# Patient Record
Sex: MALE | NOT HISPANIC OR LATINO | URBAN - METROPOLITAN AREA
[De-identification: names, ages, dates, MRNs, and addresses within clinical notes are randomized per-mention and may not be internally consistent; named-entity substitution may affect disease eponyms.]

---

## 2021-11-30 ENCOUNTER — EMERGENCY (EMERGENCY)
Facility: HOSPITAL | Age: 36
LOS: 1 days | Discharge: HOME | End: 2021-11-30
Admitting: EMERGENCY MEDICINE
Payer: MEDICAID

## 2021-11-30 VITALS
HEART RATE: 71 BPM | TEMPERATURE: 99 F | OXYGEN SATURATION: 99 % | RESPIRATION RATE: 18 BRPM | DIASTOLIC BLOOD PRESSURE: 63 MMHG | WEIGHT: 119.93 LBS | SYSTOLIC BLOOD PRESSURE: 102 MMHG

## 2021-11-30 DIAGNOSIS — M54.9 DORSALGIA, UNSPECIFIED: ICD-10-CM

## 2021-11-30 PROCEDURE — 99283 EMERGENCY DEPT VISIT LOW MDM: CPT

## 2021-11-30 NOTE — ED ADULT TRIAGE NOTE - CHIEF COMPLAINT QUOTE
Pt walked in c/o atraumatic back pain over the last few days. States pain unrelieved by OTC ibuprofen. Denies numbness, tingling, BM or urination changes.

## 2021-12-01 RX ORDER — LIDOCAINE 4 G/100G
1 CREAM TOPICAL ONCE
Refills: 0 | Status: COMPLETED | OUTPATIENT
Start: 2021-12-01 | End: 2021-12-01

## 2021-12-01 RX ORDER — CYCLOBENZAPRINE HYDROCHLORIDE 10 MG/1
10 TABLET, FILM COATED ORAL ONCE
Refills: 0 | Status: COMPLETED | OUTPATIENT
Start: 2021-12-01 | End: 2021-12-01

## 2021-12-01 RX ORDER — IBUPROFEN 200 MG
1 TABLET ORAL
Qty: 56 | Refills: 0
Start: 2021-12-01 | End: 2021-12-14

## 2021-12-01 RX ORDER — IBUPROFEN 200 MG
600 TABLET ORAL ONCE
Refills: 0 | Status: COMPLETED | OUTPATIENT
Start: 2021-12-01 | End: 2021-12-01

## 2021-12-01 RX ORDER — TIZANIDINE 4 MG/1
2 TABLET ORAL
Qty: 84 | Refills: 0
Start: 2021-12-01 | End: 2021-12-14

## 2021-12-01 RX ORDER — MENTHOL AND CAPSAICIN .0375; 5 G/100G; G/100G
1 PATCH TOPICAL
Qty: 1 | Refills: 0
Start: 2021-12-01 | End: 2021-12-14

## 2021-12-01 RX ADMIN — Medication 600 MILLIGRAM(S): at 00:23

## 2021-12-01 RX ADMIN — LIDOCAINE 1 PATCH: 4 CREAM TOPICAL at 00:23

## 2021-12-01 RX ADMIN — CYCLOBENZAPRINE HYDROCHLORIDE 10 MILLIGRAM(S): 10 TABLET, FILM COATED ORAL at 00:23

## 2021-12-01 NOTE — ED PROVIDER NOTE - OBJECTIVE STATEMENT
35 yo m no sig pmhx pw acute onset of perispinal back pain aching mod in severity began 3 d ago with no provoking or precipitating factors, worse with torso twisting. No cough, fevers, chills, cp, sob, palpitations, abd pain, difficulty with urine, ivdu, h/o tb, h/o malignancy, saddle anesthesias, or urinary incontinences 35 yo m no sig pmhx pw acute onset of perispinal back pain aching mod in severity began 3 d ago with no provoking or precipitating factors, worse with torso twisting. No cough, fevers, chills, cp, sob, palpitations, abd pain, difficulty with urine, ivdu, h/o tb, h/o malignancy, saddle anesthesias, or urinary incontinences.    I have reviewed available current nursing and previous documentation of past medical, surgical, family, and/or social history.

## 2021-12-01 NOTE — ED PROVIDER NOTE - PHYSICAL EXAMINATION
Physical Exam    Vital Signs: I have reviewed the initial vital signs.  Constitutional: well-nourished, appears stated age, no acute distress  Cardiovascular: regular rate, regular rhythm, well-perfused extremities  Respiratory: unlabored respiratory effort  Musculoskeletal: supple neck, +b/l perispinal ttp, pain worse with torso flexion improves with rest  Neurologic: extremities’ motor and sensory functions grossly intact

## 2021-12-01 NOTE — ED PROVIDER NOTE - NSFOLLOWUPCLINICS_GEN_ALL_ED_FT
[facility]  Family Medicine  [address], [address]  [place], [place] [address]  Phone: [phone]  Fax:

## 2021-12-01 NOTE — ED PROVIDER NOTE - CLINICAL SUMMARY MEDICAL DECISION MAKING FREE TEXT BOX
35 yo m no sig pmhx pw acute onset of perispinal back pain aching mod in severity began 3 d ago with no provoking or precipitating factors, worse with torso twisting. No cough, fevers, chills, cp, sob, palpitations, abd pain, difficulty with urine, ivdu, h/o tb, h/o malignancy, saddle anesthesias, or urinary incontinences. +ttp over the perispinal muscles, neurovascular intact.

## 2021-12-01 NOTE — ED PROVIDER NOTE - PATIENT PORTAL LINK FT
You can access the FollowMyHealth Patient Portal offered by [facility] by registering at the following website: [email or web address]. By joining [employer]’s FollowMyHealth portal, you will also be able to view your health information using other applications (apps) compatible with our system.

## 2021-12-01 NOTE — ED ADULT NURSE NOTE - NSIMPLEMENTINTERV_GEN_ALL_ED
Implemented All Universal Safety Interventions:  [place] to call system. Call bell, personal items and telephone within reach. Instruct patient to call for assistance. Room bathroom lighting operational. Non-slip footwear when patient is off stretcher. Physically safe environment: no spills, clutter or unnecessary equipment. Stretcher in lowest position, wheels locked, appropriate side rails in place.

## 2021-12-01 NOTE — ED PROVIDER NOTE - NS ED ROS FT
Review of Systems    Constitutional: (-) fever  Eyes/ENT: (-) change in vision, (-) sore throat, (-) ear pain  Cardiovascular: (-) chest pain, (-) palpitation   Respiratory: (-) cough, (-) shortness of breath  Gastrointestinal: (-) abdominal pain (-) vomiting, (-) diarrhea  Musculoskeletal: (-) neck pain, (-) joint pain  Integumentary: (-) rash, (-) edema  Neurological: (-) headache, (-) altered mental status

## 2022-11-09 NOTE — ED ADULT NURSE NOTE - PRO INTERPRETER NEED 2
SAFETY CHECKLIST  ID Bands and Risk clasps correct and in place (DNR, Fall risk, Allergy, Latex, Limb):  Yes  All Lines Reconciled and labeled correctly: Yes  Whiteboard updated:Yes  Environmental interventions (bed/chair alarm on, call light, side rails, restraints, sitter....): Yes  Verify Tele #:          English

## 2024-08-21 NOTE — ED PROVIDER NOTE - NS ED ATTENDING NAME FT
"Subjective   Patient ID: Grace Urban is a 45 y.o. female who presents for Annual Exam (Pt feels that her hormones are \"off balance\" and requesting possibly a hormone panel work up or discuss options with PCP ) and Pain (RIGHT elbow and radiates down forearm intoe wrist ONGOING for 3 weeks ).    INTERVAL HX/CURRENT CONCERNS:  --Rt arm pain - starts at elbow and radiates down forearm. Bothers with things like opening jar. Present x few weeks. Does carry 3.6 yo daughter in that arm.   --Concerned about  attention issues, has had a lot of stress over past couple of years. Does take sertraline for anxiety. Wakes up at night and unable to get back to sleep at times. Worry about kids a lot. Feels overwhelmed. Does not feel the sertraline is especially helpful. Has always has issues completing tasks. Mood is more depressed lately. Sleep is ok. Falls asleep easily but wakes in the night and cannot get back to sleep. Does have some panic symptoms at times, gets overwhelmed and unable to get work done.     CHRONIC CONDITIONS:  Asthma - MARCOS PRN. Allergy and illness induced.  Anxiety - Sertraline 100 mg - filled currently by gyn.   Psoriasis - triamcinolone PRN  Nephrolithiasis - has had around 12 times, previously following with Dr. Dougherty      Works in sales for [employer]  Immunizations:     Tdap 2020    Pneumococcal - discussed (asthma)    Shingles -     COVID - discussed updated vaccine    Influenza - recommend annually    The 10-year ASCVD risk score (Brian CHUNG, et al., 2019) is: 0.5%    Values used to calculate the score:      Age: 45 years      Sex: Female      Is Non- : No      Diabetic: No      Tobacco smoker: No      Systolic Blood Pressure: 120 mmHg      Is BP treated: No      HDL Cholesterol: 81 mg/dL      Total Cholesterol: 205 mg/dL      Colonoscopy: agreeable to referral   Lung cancer screening: NEVER smoker     WOMEN'S Health  Follows with Dr. Daley for " [facility]. Appt 11/2024   Postmenopausal: no  LMP: irregular - has menses every 3 weeks, LMP first week of the month, have been shorter, every 21-23 days   HRT use: no   H/o Gyn Surgery - none  2021 NILM/HPV neg  Next PAP - per gyn  Sexually active: yes,   OB History    No obstetric history on file.       Last mammogram: prefers to have ordered by gyn  History of abnormal mammogram:   Bone Density: start age 63    Current Outpatient Medications   Medication Instructions    albuterol (ProAir HFA) 90 mcg/actuation inhaler 2 puff(s), Inhale, q4-6 hrs, PRN: as needed for wheezing, # 2 EA, 2 Refill(s), Type: Maintenance, Pharmacy: [id number], 2 puff(s) Inhale q4-6 hrs,PRN:as needed for wheezing, 61, in, 06/23/23 9:18:00 EDT, Height Measured, 120.2, lb, 06/23/23 9:18:00 EDT, Weight Measured    ondansetron ODT (ZOFRAN-ODT) 4 mg, oral, Every 8 hours PRN    sertraline (ZOLOFT) 100 mg, oral, Daily    triamcinolone (Kenalog) 0.1 % cream Topical     Allergies   Allergen Reactions    Levofloxacin Anaphylaxis    Penicillins Rash    Cephalexin Unknown    Clarithromycin Unknown    Erythromycin Nausea/vomiting, GI Upset and Rash       Immunization History   Administered Date(s) Administered    Moderna SARS-CoV-2 Vaccination 04/28/2021, 11/23/2021    Pfizer Purple Cap SARS-CoV-2 04/02/2021     Past Surgical History:   Procedure Laterality Date    DILATION AND CURETTAGE OF UTERUS  04/12/2018    Dilation And Curettage    OTHER SURGICAL HISTORY  04/12/2018    Breast Surgery Enlargement Procedure     No family history on file.  Social History     Tobacco Use    Smoking status: Never     Passive exposure: Never    Smokeless tobacco: Never   Vaping Use    Vaping status: Never Used   Substance Use Topics    Alcohol use: Yes     Comment: occasional    Drug use: Never       Review of Systems   Constitutional:  Negative for chills and fever.   HENT:  Negative for trouble swallowing.    Eyes:  Negative for visual disturbance.  "  Respiratory:  Negative for shortness of breath and wheezing.    Cardiovascular:  Negative for chest pain, palpitations and leg swelling.   Gastrointestinal:  Negative for abdominal pain, blood in stool, constipation, diarrhea and nausea.   Genitourinary:  Negative for dysuria, vaginal bleeding, vaginal discharge and vaginal pain.   Musculoskeletal:         Rt arm pain   Skin:  Negative for rash.   Neurological:  Negative for headaches.   Psychiatric/Behavioral:  Positive for decreased concentration. Negative for dysphoric mood. The patient is not nervous/anxious.        Objective   Visit Vitals  /78   Pulse 74   Temp 36.8 °C (98.2 °F)   Ht 1.53 m (5' 0.24\")   Wt 59.8 kg (131 lb 12.8 oz)   SpO2 98%   BMI 25.54 kg/m²   Smoking Status Never   BSA 1.59 m²       Physical Exam  Constitutional:       Appearance: Normal appearance.   HENT:      Head: Normocephalic and atraumatic.      Right Ear: Tympanic membrane, ear canal and external ear normal.      Left Ear: Tympanic membrane, ear canal and external ear normal.      Mouth/Throat:      Mouth: Mucous membranes are moist.      Pharynx: Oropharynx is clear.   Eyes:      Extraocular Movements: Extraocular movements intact.      Conjunctiva/sclera: Conjunctivae normal.      Pupils: Pupils are equal, round, and reactive to light.   Cardiovascular:      Rate and Rhythm: Normal rate and regular rhythm.      Pulses: Normal pulses.      Heart sounds: Normal heart sounds. No murmur heard.  Pulmonary:      Effort: Pulmonary effort is normal.      Breath sounds: Normal breath sounds. No wheezing, rhonchi or rales.   Abdominal:      General: Abdomen is flat.      Palpations: Abdomen is soft.      Tenderness: There is no abdominal tenderness.   Musculoskeletal:         General: Normal range of motion.      Right elbow: No swelling or deformity. Tenderness: over lateral epicondyle. mild pain with resisted wrist extension.     Left elbow: Normal.      Cervical back: Neck supple. "   Skin:     General: Skin is warm and dry.   Neurological:      Mental Status: She is alert.   Psychiatric:         Mood and Affect: Mood normal.         Behavior: Behavior normal.           Assessment/Plan   1. Annual physical exam  Well adult exam.  1. Age appropriate preventative measures reviewed.   2. Encouraged healthy diet and exercise.  3. Immunizations- Reviewed and discussed  4. Labs- Reviewed and discussed with patient  5. Medications- Reviewed      2. BARBY (generalized anxiety disorder)  Add hydroxyzine as needed for sleep/nighttime anxiety. Medication indications, use and potential side effects discussed in detail. Follow up 2 mos.  - escitalopram (Lexapro) 10 mg tablet; Take 1 tablet (10 mg) by mouth once daily.  Dispense: 30 tablet; Refill: 2  - hydrOXYzine HCL (Atarax) 25 mg tablet; Take 1 tablet (25 mg) by mouth every 8 hours if needed for anxiety.  Dispense: 60 tablet; Refill: 0    3. Lateral epicondylitis of right elbow  Diagnosis and management reviewed. Recommend activity modification, avoid aggravating activities as feasible. Counterforce brace. NSAIDs. Follow up 4 weeks if still bothersome or worsening.     4. Colon cancer screening  - Referral to Gastroenterology; Future    5. Mild intermittent asthma, uncomplicated (HHS-HCC)  Stable. Continue PRN MARCOS    6. Hyperlipidemia, unspecified hyperlipidemia type  Lipid panel reviewed. Low ASCVD risk. Recommend reduction in saturated fat and total caloric intake. Regular exercise.         Dr Oviedo, J